# Patient Record
Sex: FEMALE | Race: WHITE
[De-identification: names, ages, dates, MRNs, and addresses within clinical notes are randomized per-mention and may not be internally consistent; named-entity substitution may affect disease eponyms.]

---

## 2017-09-20 ENCOUNTER — HOSPITAL ENCOUNTER (OUTPATIENT)
Dept: HOSPITAL 8 - STAR | Age: 57
Discharge: HOME | End: 2017-09-20
Attending: SURGERY
Payer: MEDICARE

## 2017-09-20 DIAGNOSIS — Z02.9: Primary | ICD-10-CM

## 2017-09-28 ENCOUNTER — HOSPITAL ENCOUNTER (OUTPATIENT)
Dept: HOSPITAL 8 - OUT | Age: 57
Discharge: HOME | End: 2017-09-28
Attending: SURGERY
Payer: MEDICARE

## 2017-09-28 VITALS — BODY MASS INDEX: 29.95 KG/M2 | WEIGHT: 209.22 LBS | HEIGHT: 70 IN

## 2017-09-28 VITALS — SYSTOLIC BLOOD PRESSURE: 128 MMHG | DIASTOLIC BLOOD PRESSURE: 82 MMHG

## 2017-09-28 DIAGNOSIS — I82.402: Primary | ICD-10-CM

## 2017-09-28 DIAGNOSIS — G89.29: ICD-10-CM

## 2017-09-28 DIAGNOSIS — E03.9: ICD-10-CM

## 2017-09-28 DIAGNOSIS — Z79.01: ICD-10-CM

## 2017-09-28 DIAGNOSIS — I10: ICD-10-CM

## 2017-09-28 DIAGNOSIS — E66.9: ICD-10-CM

## 2017-09-28 DIAGNOSIS — Z98.890: ICD-10-CM

## 2017-09-28 DIAGNOSIS — Z90.49: ICD-10-CM

## 2017-09-28 DIAGNOSIS — J44.9: ICD-10-CM

## 2017-09-28 DIAGNOSIS — F17.210: ICD-10-CM

## 2017-09-28 PROCEDURE — C1880 VENA CAVA FILTER: HCPCS

## 2017-09-28 PROCEDURE — C1769 GUIDE WIRE: HCPCS

## 2017-09-28 PROCEDURE — 37191 INS ENDOVAS VENA CAVA FILTR: CPT

## 2018-03-24 ENCOUNTER — HOSPITAL ENCOUNTER (INPATIENT)
Dept: HOSPITAL 8 - ED | Age: 58
LOS: 3 days | Discharge: HOME | DRG: 871 | End: 2018-03-27
Attending: HOSPITALIST | Admitting: HOSPITALIST
Payer: MEDICARE

## 2018-03-24 ENCOUNTER — HOSPITAL ENCOUNTER (EMERGENCY)
Dept: HOSPITAL 8 - ED | Age: 58
End: 2018-03-24
Payer: MEDICARE

## 2018-03-24 VITALS — HEIGHT: 70 IN | BODY MASS INDEX: 29.54 KG/M2 | WEIGHT: 206.35 LBS

## 2018-03-24 VITALS — SYSTOLIC BLOOD PRESSURE: 139 MMHG | DIASTOLIC BLOOD PRESSURE: 64 MMHG

## 2018-03-24 DIAGNOSIS — G89.29: ICD-10-CM

## 2018-03-24 DIAGNOSIS — F99: ICD-10-CM

## 2018-03-24 DIAGNOSIS — E55.9: ICD-10-CM

## 2018-03-24 DIAGNOSIS — Z98.1: ICD-10-CM
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
## 2018-03-24 DIAGNOSIS — T38.0X5A: ICD-10-CM

## 2018-03-24 DIAGNOSIS — M40.209: ICD-10-CM

## 2018-03-24 DIAGNOSIS — K14.6: ICD-10-CM

## 2018-03-24 DIAGNOSIS — K12.2: ICD-10-CM

## 2018-03-24 DIAGNOSIS — E78.5: ICD-10-CM

## 2018-03-24 DIAGNOSIS — B17.9: ICD-10-CM
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
## 2018-03-24 DIAGNOSIS — A41.9: Primary | ICD-10-CM

## 2018-03-24 DIAGNOSIS — J98.8: Primary | ICD-10-CM

## 2018-03-24 DIAGNOSIS — Z86.718: ICD-10-CM

## 2018-03-24 DIAGNOSIS — Z79.01: ICD-10-CM

## 2018-03-24 DIAGNOSIS — J96.01: ICD-10-CM

## 2018-03-24 DIAGNOSIS — D75.89: ICD-10-CM

## 2018-03-24 DIAGNOSIS — E03.9: ICD-10-CM

## 2018-03-24 DIAGNOSIS — Z53.21: ICD-10-CM

## 2018-03-24 DIAGNOSIS — Z91.040: ICD-10-CM

## 2018-03-24 DIAGNOSIS — I10: ICD-10-CM

## 2018-03-24 DIAGNOSIS — R13.10: ICD-10-CM
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
## 2018-03-24 DIAGNOSIS — S01.512A: ICD-10-CM
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
## 2018-03-24 DIAGNOSIS — F17.210: ICD-10-CM

## 2018-03-24 LAB
ALBUMIN SERPL-MCNC: 3.9 G/DL (ref 3.4–5)
ALP SERPL-CCNC: 77 U/L (ref 45–117)
ALT SERPL-CCNC: 23 U/L (ref 12–78)
ANION GAP SERPL CALC-SCNC: 6 MMOL/L (ref 5–15)
BASOPHILS # BLD AUTO: 0.03 X10^3/UL (ref 0–0.1)
BASOPHILS NFR BLD AUTO: 0 % (ref 0–1)
BILIRUB SERPL-MCNC: 0.8 MG/DL (ref 0.2–1)
CALCIUM SERPL-MCNC: 8.6 MG/DL (ref 8.5–10.1)
CHLORIDE SERPL-SCNC: 102 MMOL/L (ref 98–107)
CREAT SERPL-MCNC: 0.85 MG/DL (ref 0.55–1.02)
EOSINOPHIL # BLD AUTO: 0.01 X10^3/UL (ref 0–0.4)
EOSINOPHIL NFR BLD AUTO: 0 % (ref 1–7)
ERYTHROCYTE [DISTWIDTH] IN BLOOD BY AUTOMATED COUNT: 14.7 % (ref 9.6–15.2)
EST. AVERAGE GLUCOSE BLD GHB EST-MCNC: 100 MG/DL (ref 0–126)
HBA1C MFR BLD: 5.1 % (ref 4.2–6.3)
LYMPHOCYTES # BLD AUTO: 1.21 X10^3/UL (ref 1–3.4)
LYMPHOCYTES NFR BLD AUTO: 8 % (ref 22–44)
MCH RBC QN AUTO: 35.2 PG (ref 27–34.8)
MCHC RBC AUTO-ENTMCNC: 34.4 G/DL (ref 32.4–35.8)
MCV RBC AUTO: 102.5 FL (ref 80–100)
MD: (no result)
MONOCYTES # BLD AUTO: 0.94 X10^3/UL (ref 0.2–0.8)
MONOCYTES NFR BLD AUTO: 6 % (ref 2–9)
NEUTROPHILS # BLD AUTO: 13.63 X10^3/UL (ref 1.8–6.8)
NEUTROPHILS NFR BLD AUTO: 86 % (ref 42–75)
PLATELET # BLD AUTO: 240 X10^3/UL (ref 130–400)
PMV BLD AUTO: 7.1 FL (ref 7.4–10.4)
PROT SERPL-MCNC: 7.2 G/DL (ref 6.4–8.2)
RBC # BLD AUTO: 4.43 X10^6/UL (ref 3.82–5.3)
T4 FREE SERPL-MCNC: 1.11 NG/DL (ref 0.76–1.46)
TSH SERPL-ACNC: 7.66 MIU/L (ref 0.36–3.74)

## 2018-03-24 PROCEDURE — 84439 ASSAY OF FREE THYROXINE: CPT

## 2018-03-24 PROCEDURE — 87040 BLOOD CULTURE FOR BACTERIA: CPT

## 2018-03-24 PROCEDURE — 96367 TX/PROPH/DG ADDL SEQ IV INF: CPT

## 2018-03-24 PROCEDURE — 84443 ASSAY THYROID STIM HORMONE: CPT

## 2018-03-24 PROCEDURE — 99291 CRITICAL CARE FIRST HOUR: CPT

## 2018-03-24 PROCEDURE — 82746 ASSAY OF FOLIC ACID SERUM: CPT

## 2018-03-24 PROCEDURE — 87205 SMEAR GRAM STAIN: CPT

## 2018-03-24 PROCEDURE — 87324 CLOSTRIDIUM AG IA: CPT

## 2018-03-24 PROCEDURE — 85025 COMPLETE CBC W/AUTO DIFF WBC: CPT

## 2018-03-24 PROCEDURE — 82607 VITAMIN B-12: CPT

## 2018-03-24 PROCEDURE — 87081 CULTURE SCREEN ONLY: CPT

## 2018-03-24 PROCEDURE — 81001 URINALYSIS AUTO W/SCOPE: CPT

## 2018-03-24 PROCEDURE — 83735 ASSAY OF MAGNESIUM: CPT

## 2018-03-24 PROCEDURE — 36415 COLL VENOUS BLD VENIPUNCTURE: CPT

## 2018-03-24 PROCEDURE — 71045 X-RAY EXAM CHEST 1 VIEW: CPT

## 2018-03-24 PROCEDURE — 83605 ASSAY OF LACTIC ACID: CPT

## 2018-03-24 PROCEDURE — 80053 COMPREHEN METABOLIC PANEL: CPT

## 2018-03-24 PROCEDURE — 96376 TX/PRO/DX INJ SAME DRUG ADON: CPT

## 2018-03-24 PROCEDURE — 83036 HEMOGLOBIN GLYCOSYLATED A1C: CPT

## 2018-03-24 PROCEDURE — 82306 VITAMIN D 25 HYDROXY: CPT

## 2018-03-24 PROCEDURE — 87070 CULTURE OTHR SPECIMN AEROBIC: CPT

## 2018-03-24 PROCEDURE — 96365 THER/PROPH/DIAG IV INF INIT: CPT

## 2018-03-24 PROCEDURE — 80061 LIPID PANEL: CPT

## 2018-03-24 PROCEDURE — 93005 ELECTROCARDIOGRAM TRACING: CPT

## 2018-03-24 PROCEDURE — 84145 PROCALCITONIN (PCT): CPT

## 2018-03-24 PROCEDURE — 70491 CT SOFT TISSUE NECK W/DYE: CPT

## 2018-03-24 PROCEDURE — 0W933ZZ DRAINAGE OF ORAL CAVITY AND THROAT, PERCUTANEOUS APPROACH: ICD-10-PCS | Performed by: OTOLARYNGOLOGY

## 2018-03-24 PROCEDURE — 96375 TX/PRO/DX INJ NEW DRUG ADDON: CPT

## 2018-03-24 RX ADMIN — MORPHINE SULFATE PRN MG: 4 INJECTION INTRAVENOUS at 21:48

## 2018-03-24 RX ADMIN — DEXAMETHASONE SODIUM PHOSPHATE SCH MG: 4 INJECTION, SOLUTION INTRA-ARTICULAR; INTRALESIONAL; INTRAMUSCULAR; INTRAVENOUS; SOFT TISSUE at 22:54

## 2018-03-24 RX ADMIN — MORPHINE SULFATE PRN MG: 4 INJECTION INTRAVENOUS at 20:24

## 2018-03-24 RX ADMIN — NICOTINE SCH PATCH: 7 PATCH, EXTENDED RELEASE TRANSDERMAL at 22:54

## 2018-03-24 RX ADMIN — DEXTROSE, SODIUM CHLORIDE, AND POTASSIUM CHLORIDE SCH MLS/HR: 5; .9; .15 INJECTION INTRAVENOUS at 22:44

## 2018-03-24 RX ADMIN — MORPHINE SULFATE PRN MG: 10 INJECTION INTRAVENOUS at 22:54

## 2018-03-25 VITALS — SYSTOLIC BLOOD PRESSURE: 113 MMHG | DIASTOLIC BLOOD PRESSURE: 61 MMHG

## 2018-03-25 LAB
ALBUMIN SERPL-MCNC: 3.5 G/DL (ref 3.4–5)
ALP SERPL-CCNC: 224 U/L (ref 45–117)
ALT SERPL-CCNC: 160 U/L (ref 12–78)
ANION GAP SERPL CALC-SCNC: 7 MMOL/L (ref 5–15)
BASOPHILS # BLD AUTO: 0.02 X10^3/UL (ref 0–0.1)
BASOPHILS NFR BLD AUTO: 0 % (ref 0–1)
BILIRUB SERPL-MCNC: 1.2 MG/DL (ref 0.2–1)
CALCIUM SERPL-MCNC: 8.4 MG/DL (ref 8.5–10.1)
CHLORIDE SERPL-SCNC: 107 MMOL/L (ref 98–107)
CHOL/HDL RATIO: 1.6
CREAT SERPL-MCNC: 0.86 MG/DL (ref 0.55–1.02)
CULTURE INDICATED?: NO
EOSINOPHIL # BLD AUTO: 0.01 X10^3/UL (ref 0–0.4)
EOSINOPHIL NFR BLD AUTO: 0 % (ref 1–7)
ERYTHROCYTE [DISTWIDTH] IN BLOOD BY AUTOMATED COUNT: 15 % (ref 9.6–15.2)
HDL CHOL %: 62 % (ref 28–40)
HDL CHOLESTEROL (DIRECT): 109 MG/DL (ref 40–60)
LDL CHOLESTEROL,CALCULATED: 56 MG/DL (ref 54–169)
LDLC/HDLC SERPL: 0.5 {RATIO} (ref 0.5–3)
LYMPHOCYTES # BLD AUTO: 0.5 X10^3/UL (ref 1–3.4)
LYMPHOCYTES NFR BLD AUTO: 3 % (ref 22–44)
MCH RBC QN AUTO: 36 PG (ref 27–34.8)
MCHC RBC AUTO-ENTMCNC: 34.3 G/DL (ref 32.4–35.8)
MCV RBC AUTO: 105 FL (ref 80–100)
MD: NO
MICROSCOPIC: (no result)
MONOCYTES # BLD AUTO: 0.51 X10^3/UL (ref 0.2–0.8)
MONOCYTES NFR BLD AUTO: 3 % (ref 2–9)
NEUTROPHILS # BLD AUTO: 16.69 X10^3/UL (ref 1.8–6.8)
NEUTROPHILS NFR BLD AUTO: 94 % (ref 42–75)
PLATELET # BLD AUTO: 239 X10^3/UL (ref 130–400)
PMV BLD AUTO: 7.3 FL (ref 7.4–10.4)
PROT SERPL-MCNC: 7.2 G/DL (ref 6.4–8.2)
RBC # BLD AUTO: 4.28 X10^6/UL (ref 3.82–5.3)
TRIGL SERPL-MCNC: 61 MG/DL (ref 50–200)
VLDLC SERPL CALC-MCNC: 12 MG/DL (ref 0–25)

## 2018-03-25 RX ADMIN — KETOROLAC TROMETHAMINE PRN MG: 30 INJECTION, SOLUTION INTRAMUSCULAR at 17:19

## 2018-03-25 RX ADMIN — DEXAMETHASONE SODIUM PHOSPHATE SCH MG: 4 INJECTION, SOLUTION INTRA-ARTICULAR; INTRALESIONAL; INTRAMUSCULAR; INTRAVENOUS; SOFT TISSUE at 22:18

## 2018-03-25 RX ADMIN — DEXTROSE SCH MLS/HR: 50 INJECTION, SOLUTION INTRAVENOUS at 13:34

## 2018-03-25 RX ADMIN — DEXTROSE, SODIUM CHLORIDE, AND POTASSIUM CHLORIDE SCH MLS/HR: 5; .9; .15 INJECTION INTRAVENOUS at 10:12

## 2018-03-25 RX ADMIN — DEXTROSE SCH MLS/HR: 50 INJECTION, SOLUTION INTRAVENOUS at 07:49

## 2018-03-25 RX ADMIN — DEXTROSE SCH MLS/HR: 50 INJECTION, SOLUTION INTRAVENOUS at 19:31

## 2018-03-25 RX ADMIN — MORPHINE SULFATE PRN MG: 10 INJECTION INTRAVENOUS at 07:50

## 2018-03-25 RX ADMIN — DEXAMETHASONE SODIUM PHOSPHATE SCH MG: 4 INJECTION, SOLUTION INTRA-ARTICULAR; INTRALESIONAL; INTRAMUSCULAR; INTRAVENOUS; SOFT TISSUE at 10:13

## 2018-03-25 RX ADMIN — KETOROLAC TROMETHAMINE PRN MG: 30 INJECTION, SOLUTION INTRAMUSCULAR at 10:52

## 2018-03-25 RX ADMIN — DEXAMETHASONE SODIUM PHOSPHATE SCH MG: 4 INJECTION, SOLUTION INTRA-ARTICULAR; INTRALESIONAL; INTRAMUSCULAR; INTRAVENOUS; SOFT TISSUE at 17:18

## 2018-03-25 RX ADMIN — NICOTINE SCH PATCH: 7 PATCH, EXTENDED RELEASE TRANSDERMAL at 22:18

## 2018-03-25 RX ADMIN — Medication SCH MCG: at 07:51

## 2018-03-25 RX ADMIN — OXYCODONE HYDROCHLORIDE PRN MG: 5 TABLET ORAL at 00:59

## 2018-03-25 RX ADMIN — LEVOTHYROXINE SODIUM ANHYDROUS SCH MCG: 100 INJECTION, POWDER, LYOPHILIZED, FOR SOLUTION INTRAVENOUS at 07:51

## 2018-03-25 RX ADMIN — DEXTROSE SCH MLS/HR: 50 INJECTION, SOLUTION INTRAVENOUS at 01:00

## 2018-03-25 RX ADMIN — MORPHINE SULFATE PRN MG: 10 INJECTION INTRAVENOUS at 04:45

## 2018-03-25 RX ADMIN — MORPHINE SULFATE PRN MG: 10 INJECTION INTRAVENOUS at 01:53

## 2018-03-25 RX ADMIN — VANCOMYCIN HYDROCHLORIDE SCH MLS/HR: 1 INJECTION, POWDER, LYOPHILIZED, FOR SOLUTION INTRAVENOUS at 14:46

## 2018-03-25 RX ADMIN — DEXAMETHASONE SODIUM PHOSPHATE SCH MG: 4 INJECTION, SOLUTION INTRA-ARTICULAR; INTRALESIONAL; INTRAMUSCULAR; INTRAVENOUS; SOFT TISSUE at 04:12

## 2018-03-26 VITALS — DIASTOLIC BLOOD PRESSURE: 78 MMHG | SYSTOLIC BLOOD PRESSURE: 120 MMHG

## 2018-03-26 VITALS — SYSTOLIC BLOOD PRESSURE: 142 MMHG | DIASTOLIC BLOOD PRESSURE: 87 MMHG

## 2018-03-26 VITALS — SYSTOLIC BLOOD PRESSURE: 131 MMHG | DIASTOLIC BLOOD PRESSURE: 76 MMHG

## 2018-03-26 LAB
ALBUMIN SERPL-MCNC: 3.1 G/DL (ref 3.4–5)
ALP SERPL-CCNC: 130 U/L (ref 45–117)
ALT SERPL-CCNC: 96 U/L (ref 12–78)
ANION GAP SERPL CALC-SCNC: 5 MMOL/L (ref 5–15)
BASOPHILS # BLD AUTO: 0.03 X10^3/UL (ref 0–0.1)
BASOPHILS NFR BLD AUTO: 0 % (ref 0–1)
BILIRUB SERPL-MCNC: 0.5 MG/DL (ref 0.2–1)
CALCIUM SERPL-MCNC: 8.4 MG/DL (ref 8.5–10.1)
CHLORIDE SERPL-SCNC: 109 MMOL/L (ref 98–107)
CREAT SERPL-MCNC: 0.85 MG/DL (ref 0.55–1.02)
EOSINOPHIL # BLD AUTO: 0 X10^3/UL (ref 0–0.4)
EOSINOPHIL NFR BLD AUTO: 0 % (ref 1–7)
ERYTHROCYTE [DISTWIDTH] IN BLOOD BY AUTOMATED COUNT: 14.9 % (ref 9.6–15.2)
LYMPHOCYTES # BLD AUTO: 0.81 X10^3/UL (ref 1–3.4)
LYMPHOCYTES NFR BLD AUTO: 5 % (ref 22–44)
MCH RBC QN AUTO: 35.5 PG (ref 27–34.8)
MCHC RBC AUTO-ENTMCNC: 33.3 G/DL (ref 32.4–35.8)
MCV RBC AUTO: 106.4 FL (ref 80–100)
MD: NO
MONOCYTES # BLD AUTO: 0.66 X10^3/UL (ref 0.2–0.8)
MONOCYTES NFR BLD AUTO: 4 % (ref 2–9)
NEUTROPHILS # BLD AUTO: 14.72 X10^3/UL (ref 1.8–6.8)
NEUTROPHILS NFR BLD AUTO: 91 % (ref 42–75)
PLATELET # BLD AUTO: 231 X10^3/UL (ref 130–400)
PMV BLD AUTO: 7.3 FL (ref 7.4–10.4)
PROT SERPL-MCNC: 6.6 G/DL (ref 6.4–8.2)
RBC # BLD AUTO: 4.08 X10^6/UL (ref 3.82–5.3)

## 2018-03-26 RX ADMIN — Medication SCH MCG: at 09:11

## 2018-03-26 RX ADMIN — DEXAMETHASONE SODIUM PHOSPHATE SCH MG: 4 INJECTION, SOLUTION INTRA-ARTICULAR; INTRALESIONAL; INTRAMUSCULAR; INTRAVENOUS; SOFT TISSUE at 16:42

## 2018-03-26 RX ADMIN — OXYCODONE HYDROCHLORIDE PRN MG: 5 TABLET ORAL at 20:30

## 2018-03-26 RX ADMIN — DEXAMETHASONE SODIUM PHOSPHATE SCH MG: 4 INJECTION, SOLUTION INTRA-ARTICULAR; INTRALESIONAL; INTRAMUSCULAR; INTRAVENOUS; SOFT TISSUE at 21:37

## 2018-03-26 RX ADMIN — DEXAMETHASONE SODIUM PHOSPHATE SCH MG: 4 INJECTION, SOLUTION INTRA-ARTICULAR; INTRALESIONAL; INTRAMUSCULAR; INTRAVENOUS; SOFT TISSUE at 10:48

## 2018-03-26 RX ADMIN — KETOROLAC TROMETHAMINE PRN MG: 30 INJECTION, SOLUTION INTRAMUSCULAR at 15:38

## 2018-03-26 RX ADMIN — VANCOMYCIN HYDROCHLORIDE SCH MLS/HR: 1 INJECTION, POWDER, LYOPHILIZED, FOR SOLUTION INTRAVENOUS at 09:11

## 2018-03-26 RX ADMIN — KETOROLAC TROMETHAMINE PRN MG: 30 INJECTION, SOLUTION INTRAMUSCULAR at 01:02

## 2018-03-26 RX ADMIN — KETOROLAC TROMETHAMINE PRN MG: 30 INJECTION, SOLUTION INTRAMUSCULAR at 09:08

## 2018-03-26 RX ADMIN — NICOTINE SCH PATCH: 7 PATCH, EXTENDED RELEASE TRANSDERMAL at 21:37

## 2018-03-26 RX ADMIN — DEXAMETHASONE SODIUM PHOSPHATE SCH MG: 4 INJECTION, SOLUTION INTRA-ARTICULAR; INTRALESIONAL; INTRAMUSCULAR; INTRAVENOUS; SOFT TISSUE at 04:46

## 2018-03-26 RX ADMIN — DEXTROSE SCH MLS/HR: 50 INJECTION, SOLUTION INTRAVENOUS at 02:07

## 2018-03-26 RX ADMIN — LEVOTHYROXINE SODIUM ANHYDROUS SCH MCG: 100 INJECTION, POWDER, LYOPHILIZED, FOR SOLUTION INTRAVENOUS at 09:06

## 2018-03-26 RX ADMIN — AMPICILLIN SODIUM AND SULBACTAM SODIUM SCH MLS/HR: 2; 1 INJECTION, POWDER, FOR SOLUTION INTRAMUSCULAR; INTRAVENOUS at 14:30

## 2018-03-26 RX ADMIN — LEVOTHYROXINE SODIUM SCH MCG: 175 TABLET ORAL at 09:11

## 2018-03-26 RX ADMIN — DEXTROSE SCH MLS/HR: 50 INJECTION, SOLUTION INTRAVENOUS at 13:59

## 2018-03-26 RX ADMIN — AMPICILLIN SODIUM AND SULBACTAM SODIUM SCH MLS/HR: 2; 1 INJECTION, POWDER, FOR SOLUTION INTRAMUSCULAR; INTRAVENOUS at 21:34

## 2018-03-26 RX ADMIN — DEXTROSE SCH MLS/HR: 50 INJECTION, SOLUTION INTRAVENOUS at 08:15

## 2018-03-26 RX ADMIN — FOLIC ACID SCH MG: 1 TABLET ORAL at 09:10

## 2018-03-27 VITALS — DIASTOLIC BLOOD PRESSURE: 78 MMHG | SYSTOLIC BLOOD PRESSURE: 146 MMHG

## 2018-03-27 VITALS — DIASTOLIC BLOOD PRESSURE: 76 MMHG | SYSTOLIC BLOOD PRESSURE: 152 MMHG

## 2018-03-27 VITALS — SYSTOLIC BLOOD PRESSURE: 148 MMHG | DIASTOLIC BLOOD PRESSURE: 78 MMHG

## 2018-03-27 VITALS — DIASTOLIC BLOOD PRESSURE: 78 MMHG | SYSTOLIC BLOOD PRESSURE: 148 MMHG

## 2018-03-27 LAB
CLOSTRIDIUM DIFFICILE ANTIGEN: NEGATIVE
CLOSTRIDIUM DIFFICILE TOXIN: NEGATIVE

## 2018-03-27 RX ADMIN — AMPICILLIN SODIUM AND SULBACTAM SODIUM SCH MLS/HR: 2; 1 INJECTION, POWDER, FOR SOLUTION INTRAMUSCULAR; INTRAVENOUS at 03:32

## 2018-03-27 RX ADMIN — LEVOTHYROXINE SODIUM SCH MCG: 175 TABLET ORAL at 06:16

## 2018-03-27 RX ADMIN — OXYCODONE HYDROCHLORIDE PRN MG: 5 TABLET ORAL at 10:37

## 2018-03-27 RX ADMIN — OXYCODONE HYDROCHLORIDE PRN MG: 5 TABLET ORAL at 14:40

## 2018-03-27 RX ADMIN — OXYCODONE HYDROCHLORIDE PRN MG: 5 TABLET ORAL at 06:18

## 2018-03-27 RX ADMIN — FOLIC ACID SCH MG: 1 TABLET ORAL at 07:57

## 2018-03-27 RX ADMIN — OXYCODONE HYDROCHLORIDE PRN MG: 5 TABLET ORAL at 01:29

## 2018-03-27 RX ADMIN — DEXAMETHASONE SODIUM PHOSPHATE SCH MG: 4 INJECTION, SOLUTION INTRA-ARTICULAR; INTRALESIONAL; INTRAMUSCULAR; INTRAVENOUS; SOFT TISSUE at 03:33

## 2018-03-27 RX ADMIN — DEXAMETHASONE SODIUM PHOSPHATE SCH MG: 4 INJECTION, SOLUTION INTRA-ARTICULAR; INTRALESIONAL; INTRAMUSCULAR; INTRAVENOUS; SOFT TISSUE at 10:39

## 2018-03-27 RX ADMIN — Medication SCH MCG: at 07:57

## 2018-03-27 RX ADMIN — AMPICILLIN SODIUM AND SULBACTAM SODIUM SCH MLS/HR: 2; 1 INJECTION, POWDER, FOR SOLUTION INTRAMUSCULAR; INTRAVENOUS at 10:38

## 2018-04-11 ENCOUNTER — HOSPITAL ENCOUNTER (OUTPATIENT)
Dept: HOSPITAL 8 - CFH | Age: 58
End: 2018-04-11
Attending: PHYSICIAN ASSISTANT
Payer: MEDICARE

## 2018-04-11 DIAGNOSIS — Z02.9: Primary | ICD-10-CM

## 2018-04-19 ENCOUNTER — HOSPITAL ENCOUNTER (OUTPATIENT)
Dept: HOSPITAL 8 - RAD | Age: 58
End: 2018-04-19
Attending: PHYSICIAN ASSISTANT
Payer: MEDICARE

## 2018-04-19 DIAGNOSIS — D25.9: Primary | ICD-10-CM

## 2018-04-19 DIAGNOSIS — M53.3: ICD-10-CM

## 2018-04-19 PROCEDURE — 99157 MOD SED OTHER PHYS/QHP EA: CPT

## 2018-04-19 PROCEDURE — 99156 MOD SED OTH PHYS/QHP 5/>YRS: CPT

## 2018-04-19 PROCEDURE — 72195 MRI PELVIS W/O DYE: CPT

## 2018-06-06 ENCOUNTER — HOSPITAL ENCOUNTER (OUTPATIENT)
Dept: HOSPITAL 8 - RAD | Age: 58
Discharge: HOME | End: 2018-06-06
Attending: NURSE PRACTITIONER
Payer: MEDICARE

## 2018-06-06 DIAGNOSIS — G62.9: ICD-10-CM

## 2018-06-06 DIAGNOSIS — G31.9: Primary | ICD-10-CM

## 2018-06-06 DIAGNOSIS — R90.82: ICD-10-CM

## 2018-06-06 PROCEDURE — 70551 MRI BRAIN STEM W/O DYE: CPT

## 2018-06-13 ENCOUNTER — HOSPITAL ENCOUNTER (OUTPATIENT)
Dept: HOSPITAL 8 - CARD | Age: 58
Discharge: HOME | End: 2018-06-13
Attending: NURSE PRACTITIONER
Payer: MEDICARE

## 2018-06-13 DIAGNOSIS — G62.9: Primary | ICD-10-CM

## 2018-06-13 PROCEDURE — 95819 EEG AWAKE AND ASLEEP: CPT

## 2018-10-05 ENCOUNTER — HOSPITAL ENCOUNTER (OUTPATIENT)
Dept: HOSPITAL 8 - RAD | Age: 58
Discharge: HOME | End: 2018-10-05
Attending: PHYSICIAN ASSISTANT
Payer: MEDICARE

## 2018-10-05 DIAGNOSIS — M43.16: ICD-10-CM

## 2018-10-05 DIAGNOSIS — M43.27: Primary | ICD-10-CM

## 2018-10-05 PROCEDURE — 72131 CT LUMBAR SPINE W/O DYE: CPT

## 2018-11-29 ENCOUNTER — HOSPITAL ENCOUNTER (OUTPATIENT)
Dept: HOSPITAL 8 - STAR | Age: 58
Discharge: HOME | End: 2018-11-29
Attending: NEUROLOGICAL SURGERY
Payer: MEDICARE

## 2018-11-29 DIAGNOSIS — Z01.818: Primary | ICD-10-CM

## 2018-11-29 DIAGNOSIS — Z90.49: ICD-10-CM

## 2018-11-29 DIAGNOSIS — T84.84XD: ICD-10-CM

## 2018-11-29 DIAGNOSIS — M54.5: ICD-10-CM

## 2018-11-29 DIAGNOSIS — J43.9: ICD-10-CM

## 2018-11-29 DIAGNOSIS — M53.3: ICD-10-CM

## 2018-11-29 LAB
ANION GAP SERPL CALC-SCNC: 9 MMOL/L (ref 5–15)
APTT BLD: 29 SECONDS (ref 25–31)
BASOPHILS # BLD AUTO: 0.04 X10^3/UL (ref 0–0.1)
BASOPHILS NFR BLD AUTO: 1 % (ref 0–1)
CALCIUM SERPL-MCNC: 9.1 MG/DL (ref 8.5–10.1)
CHLORIDE SERPL-SCNC: 103 MMOL/L (ref 98–107)
CREAT SERPL-MCNC: 0.92 MG/DL (ref 0.55–1.02)
CULTURE INDICATED?: YES
EOSINOPHIL # BLD AUTO: 0.08 X10^3/UL (ref 0–0.4)
EOSINOPHIL NFR BLD AUTO: 1 % (ref 1–7)
ERYTHROCYTE [DISTWIDTH] IN BLOOD BY AUTOMATED COUNT: 12.6 % (ref 9.6–15.2)
INR PPP: 1.04 (ref 0.93–1.1)
LYMPHOCYTES # BLD AUTO: 2.49 X10^3/UL (ref 1–3.4)
LYMPHOCYTES NFR BLD AUTO: 31 % (ref 22–44)
MCH RBC QN AUTO: 34.1 PG (ref 27–34.8)
MCHC RBC AUTO-ENTMCNC: 34.1 G/DL (ref 32.4–35.8)
MCV RBC AUTO: 100 FL (ref 80–100)
MD: NO
MICROSCOPIC: (no result)
MONOCYTES # BLD AUTO: 0.48 X10^3/UL (ref 0.2–0.8)
MONOCYTES NFR BLD AUTO: 6 % (ref 2–9)
NEUTROPHILS # BLD AUTO: 5 X10^3/UL (ref 1.8–6.8)
NEUTROPHILS NFR BLD AUTO: 62 % (ref 42–75)
PLATELET # BLD AUTO: 246 X10^3/UL (ref 130–400)
PMV BLD AUTO: 7.4 FL (ref 7.4–10.4)
PROTHROMBIN TIME: 11 SECONDS (ref 9.6–11.5)
RBC # BLD AUTO: 4.37 X10^6/UL (ref 3.82–5.3)

## 2018-11-29 PROCEDURE — 85025 COMPLETE CBC W/AUTO DIFF WBC: CPT

## 2018-11-29 PROCEDURE — 80048 BASIC METABOLIC PNL TOTAL CA: CPT

## 2018-11-29 PROCEDURE — 81001 URINALYSIS AUTO W/SCOPE: CPT

## 2018-11-29 PROCEDURE — 36415 COLL VENOUS BLD VENIPUNCTURE: CPT

## 2018-11-29 PROCEDURE — 85610 PROTHROMBIN TIME: CPT

## 2018-11-29 PROCEDURE — 85730 THROMBOPLASTIN TIME PARTIAL: CPT

## 2018-11-29 PROCEDURE — 72114 X-RAY EXAM L-S SPINE BENDING: CPT

## 2018-11-29 PROCEDURE — 71046 X-RAY EXAM CHEST 2 VIEWS: CPT

## 2018-11-29 PROCEDURE — 87086 URINE CULTURE/COLONY COUNT: CPT

## 2018-11-29 PROCEDURE — 93005 ELECTROCARDIOGRAM TRACING: CPT

## 2018-12-07 ENCOUNTER — HOSPITAL ENCOUNTER (INPATIENT)
Dept: HOSPITAL 8 - OUT | Age: 58
LOS: 1 days | Discharge: HOME | DRG: 515 | End: 2018-12-08
Attending: NEUROLOGICAL SURGERY | Admitting: NEUROLOGICAL SURGERY
Payer: MEDICARE

## 2018-12-07 VITALS — DIASTOLIC BLOOD PRESSURE: 59 MMHG | SYSTOLIC BLOOD PRESSURE: 94 MMHG

## 2018-12-07 VITALS — BODY MASS INDEX: 23.67 KG/M2 | WEIGHT: 165.35 LBS | HEIGHT: 70 IN

## 2018-12-07 VITALS — SYSTOLIC BLOOD PRESSURE: 102 MMHG | DIASTOLIC BLOOD PRESSURE: 71 MMHG

## 2018-12-07 VITALS — DIASTOLIC BLOOD PRESSURE: 56 MMHG | SYSTOLIC BLOOD PRESSURE: 92 MMHG

## 2018-12-07 VITALS — SYSTOLIC BLOOD PRESSURE: 91 MMHG | DIASTOLIC BLOOD PRESSURE: 58 MMHG

## 2018-12-07 DIAGNOSIS — Z79.01: ICD-10-CM

## 2018-12-07 DIAGNOSIS — G89.29: ICD-10-CM

## 2018-12-07 DIAGNOSIS — Z98.1: ICD-10-CM

## 2018-12-07 DIAGNOSIS — R53.2: ICD-10-CM

## 2018-12-07 DIAGNOSIS — Z91.040: ICD-10-CM
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
## 2018-12-07 DIAGNOSIS — G25.9: ICD-10-CM

## 2018-12-07 DIAGNOSIS — Z86.718: ICD-10-CM

## 2018-12-07 DIAGNOSIS — T84.84XA: Primary | ICD-10-CM

## 2018-12-07 PROCEDURE — 72100 X-RAY EXAM L-S SPINE 2/3 VWS: CPT

## 2018-12-07 PROCEDURE — 01NB0ZZ RELEASE LUMBAR NERVE, OPEN APPROACH: ICD-10-PCS | Performed by: NEUROLOGICAL SURGERY

## 2018-12-07 PROCEDURE — 0SP304Z REMOVAL OF INTERNAL FIXATION DEVICE FROM LUMBOSACRAL JOINT, OPEN APPROACH: ICD-10-PCS | Performed by: NEUROLOGICAL SURGERY

## 2018-12-07 RX ADMIN — SODIUM CHLORIDE SCH MLS/HR: 9 INJECTION, SOLUTION INTRAVENOUS at 23:36

## 2018-12-07 RX ADMIN — DIAZEPAM PRN MG: 5 INJECTION, SOLUTION INTRAMUSCULAR; INTRAVENOUS at 15:20

## 2018-12-07 RX ADMIN — DIAZEPAM PRN MG: 5 INJECTION, SOLUTION INTRAMUSCULAR; INTRAVENOUS at 15:40

## 2018-12-07 RX ADMIN — METHOCARBAMOL SCH MG: 750 TABLET ORAL at 18:15

## 2018-12-07 RX ADMIN — METHOCARBAMOL SCH MG: 750 TABLET ORAL at 23:00

## 2018-12-07 RX ADMIN — FAMOTIDINE SCH MG: 20 TABLET, FILM COATED ORAL at 20:06

## 2018-12-07 RX ADMIN — FENTANYL CITRATE PRN MCG: 50 INJECTION INTRAMUSCULAR; INTRAVENOUS at 15:04

## 2018-12-07 RX ADMIN — HYDROCODONE BITARTRATE AND ACETAMINOPHEN PRN TAB: 10; 325 TABLET ORAL at 18:43

## 2018-12-07 RX ADMIN — FENTANYL CITRATE PRN MCG: 50 INJECTION INTRAMUSCULAR; INTRAVENOUS at 15:15

## 2018-12-07 RX ADMIN — DEXTROSE, SODIUM CHLORIDE, AND POTASSIUM CHLORIDE SCH MLS/HR: 5; .9; .15 INJECTION INTRAVENOUS at 20:06

## 2018-12-08 VITALS — SYSTOLIC BLOOD PRESSURE: 88 MMHG | DIASTOLIC BLOOD PRESSURE: 52 MMHG

## 2018-12-08 VITALS — DIASTOLIC BLOOD PRESSURE: 49 MMHG | SYSTOLIC BLOOD PRESSURE: 88 MMHG

## 2018-12-08 VITALS — DIASTOLIC BLOOD PRESSURE: 54 MMHG | SYSTOLIC BLOOD PRESSURE: 93 MMHG

## 2018-12-08 VITALS — DIASTOLIC BLOOD PRESSURE: 45 MMHG | SYSTOLIC BLOOD PRESSURE: 94 MMHG

## 2018-12-08 VITALS — DIASTOLIC BLOOD PRESSURE: 47 MMHG | SYSTOLIC BLOOD PRESSURE: 84 MMHG

## 2018-12-08 RX ADMIN — METHOCARBAMOL SCH MG: 750 TABLET ORAL at 01:20

## 2018-12-08 RX ADMIN — DEXTROSE, SODIUM CHLORIDE, AND POTASSIUM CHLORIDE SCH MLS/HR: 5; .9; .15 INJECTION INTRAVENOUS at 05:13

## 2018-12-08 RX ADMIN — METHOCARBAMOL SCH MG: 750 TABLET ORAL at 08:24

## 2018-12-08 RX ADMIN — SODIUM CHLORIDE SCH MLS/HR: 9 INJECTION, SOLUTION INTRAVENOUS at 07:20

## 2018-12-08 RX ADMIN — FAMOTIDINE SCH MG: 20 TABLET, FILM COATED ORAL at 08:24

## 2018-12-08 RX ADMIN — HYDROCODONE BITARTRATE AND ACETAMINOPHEN PRN TAB: 10; 325 TABLET ORAL at 10:53

## 2019-01-09 ENCOUNTER — HOSPITAL ENCOUNTER (EMERGENCY)
Dept: HOSPITAL 8 - ED | Age: 59
Discharge: HOME | End: 2019-01-09
Payer: MEDICARE

## 2019-01-09 VITALS — SYSTOLIC BLOOD PRESSURE: 113 MMHG | DIASTOLIC BLOOD PRESSURE: 71 MMHG

## 2019-01-09 VITALS — WEIGHT: 161.16 LBS | BODY MASS INDEX: 23.07 KG/M2 | HEIGHT: 70 IN

## 2019-01-09 DIAGNOSIS — M54.6: ICD-10-CM

## 2019-01-09 DIAGNOSIS — I10: ICD-10-CM

## 2019-01-09 DIAGNOSIS — J44.9: ICD-10-CM

## 2019-01-09 DIAGNOSIS — R06.00: ICD-10-CM

## 2019-01-09 DIAGNOSIS — Z90.49: ICD-10-CM

## 2019-01-09 DIAGNOSIS — R07.89: Primary | ICD-10-CM

## 2019-01-09 DIAGNOSIS — Z91.040: ICD-10-CM

## 2019-01-09 DIAGNOSIS — F11.23: ICD-10-CM

## 2019-01-09 DIAGNOSIS — Z86.718: ICD-10-CM

## 2019-01-09 LAB
ALBUMIN SERPL-MCNC: 4.4 G/DL (ref 3.4–5)
ALP SERPL-CCNC: 98 U/L (ref 45–117)
ALT SERPL-CCNC: 17 U/L (ref 12–78)
ANION GAP SERPL CALC-SCNC: 9 MMOL/L (ref 5–15)
BASOPHILS # BLD AUTO: 0.07 X10^3/UL (ref 0–0.1)
BASOPHILS NFR BLD AUTO: 1 % (ref 0–1)
BILIRUB SERPL-MCNC: 0.9 MG/DL (ref 0.2–1)
CALCIUM SERPL-MCNC: 9.1 MG/DL (ref 8.5–10.1)
CHLORIDE SERPL-SCNC: 96 MMOL/L (ref 98–107)
CREAT SERPL-MCNC: 0.85 MG/DL (ref 0.55–1.02)
EOSINOPHIL # BLD AUTO: 0.08 X10^3/UL (ref 0–0.4)
EOSINOPHIL NFR BLD AUTO: 1 % (ref 1–7)
ERYTHROCYTE [DISTWIDTH] IN BLOOD BY AUTOMATED COUNT: 12.8 % (ref 9.6–15.2)
LYMPHOCYTES # BLD AUTO: 2.12 X10^3/UL (ref 1–3.4)
LYMPHOCYTES NFR BLD AUTO: 25 % (ref 22–44)
MCH RBC QN AUTO: 33 PG (ref 27–34.8)
MCHC RBC AUTO-ENTMCNC: 33.7 G/DL (ref 32.4–35.8)
MCV RBC AUTO: 97.8 FL (ref 80–100)
MD: NO
MONOCYTES # BLD AUTO: 0.52 X10^3/UL (ref 0.2–0.8)
MONOCYTES NFR BLD AUTO: 6 % (ref 2–9)
NEUTROPHILS # BLD AUTO: 5.81 X10^3/UL (ref 1.8–6.8)
NEUTROPHILS NFR BLD AUTO: 68 % (ref 42–75)
PLATELET # BLD AUTO: 243 X10^3/UL (ref 130–400)
PMV BLD AUTO: 7.3 FL (ref 7.4–10.4)
PROT SERPL-MCNC: 6.9 G/DL (ref 6.4–8.2)
RBC # BLD AUTO: 4.47 X10^6/UL (ref 3.82–5.3)
TROPONIN I SERPL-MCNC: < 0.015 NG/ML (ref 0–0.04)

## 2019-01-09 PROCEDURE — 71275 CT ANGIOGRAPHY CHEST: CPT

## 2019-01-09 PROCEDURE — 36415 COLL VENOUS BLD VENIPUNCTURE: CPT

## 2019-01-09 PROCEDURE — 85025 COMPLETE CBC W/AUTO DIFF WBC: CPT

## 2019-01-09 PROCEDURE — 80053 COMPREHEN METABOLIC PANEL: CPT

## 2019-01-09 PROCEDURE — 99284 EMERGENCY DEPT VISIT MOD MDM: CPT

## 2019-01-09 PROCEDURE — 96374 THER/PROPH/DIAG INJ IV PUSH: CPT

## 2019-01-09 PROCEDURE — 93005 ELECTROCARDIOGRAM TRACING: CPT

## 2019-01-09 PROCEDURE — 84484 ASSAY OF TROPONIN QUANT: CPT

## 2019-01-09 NOTE — NUR
PT LYING QUIETLY ON GURNEY.  VOIDED URINE IN COLLECTION HAT.  SPECIMEN WILL BE 
SENT TO LAB.  CT TECH BS.  PT STATES "I'M NOT SURE I CAN LIE STILL".  ADVISED 
GOING TO CT AND GIVING IT A TRY.

## 2019-01-09 NOTE — NUR
MED REC IN PROCESS.  ROBAXIN BOTTLE IS EMPTY.  PER PT LAST DOSE 1/9/19 @ 1000.  
RX FILLED 12/08/18, QTY 90.  ERP WILL BE NOTIFIED.

## 2019-01-09 NOTE — NUR
CARDIAC & BP MONOTOR APPLIED.  DR RIVERA BS FOR EXAM.  PT REPORTS NEW PAIN IN 
NECK, BACK, CHEST, LT EAR.  PT'S SPOUSE CONTRIBUTING TO MEDICAL INFORMATION.  
PT SITTING UP ON SIDE OF BED, ABLE TO MOVE ALL EXTREMETIES. PT STATES SHE TOOK 
NAPROXEN TODAY: TOTAL OF 6 IN SETS OF 3 - LAST DOSE UNKOWN.  HAS BEEN TAKING 
NORCO; STATES "I'M DOWN TO TWO A DAY" (IN THE AFTERNOON) OF NORCO.  HAS BEEN 
TAKING NORCO "2 EVERY 4 HRS FOR A COUPLE OF WEEKS", THEN 1 Q 4HRS "FOR A COUPLE 
OF WEEKS". 

LOWER BACK SURGICAL SCAR: WELL HEALED.  OLD SURGICAL SCAR TO POSTERIOR NECK.

## 2019-01-09 NOTE — NUR
PT. IS NOT WANTING TO GO HOME.  DISCUSSED WITH DR. RIVERA.  MD STATES PT. DOES 
NOT HAVE AN ADMITTABLE CAUSE.  PT. WAS GIVEN DISCHARGE INSTRUCTIONS WITH 
UNDERSTANDING VERBALIZED ALONG WITH WILLINGNESS TO COMPLY.  PT.'S IV WAS 
DCD',CATH TIP INTACT.  PRESSURE HELD WITH HEMOSTASIS ACHIEVED.  PT. ASSISTED TO 
THE DISCHARGE DESK BY WHEELCHAIR.

## 2019-01-23 ENCOUNTER — HOSPITAL ENCOUNTER (OUTPATIENT)
Dept: HOSPITAL 8 - CFH | Age: 59
Discharge: HOME | End: 2019-01-23
Attending: FAMILY MEDICINE
Payer: MEDICARE

## 2019-01-23 DIAGNOSIS — Z12.31: Primary | ICD-10-CM

## 2019-01-23 DIAGNOSIS — N95.9: ICD-10-CM

## 2019-01-23 DIAGNOSIS — M85.88: ICD-10-CM

## 2019-01-23 PROCEDURE — 77080 DXA BONE DENSITY AXIAL: CPT

## 2019-02-06 ENCOUNTER — HOSPITAL ENCOUNTER (OUTPATIENT)
Dept: HOSPITAL 8 - CFH | Age: 59
Discharge: HOME | End: 2019-02-06
Attending: FAMILY MEDICINE
Payer: MEDICARE

## 2019-02-06 DIAGNOSIS — K42.9: Primary | ICD-10-CM

## 2019-02-06 PROCEDURE — 76705 ECHO EXAM OF ABDOMEN: CPT

## 2019-02-27 ENCOUNTER — HOSPITAL ENCOUNTER (OUTPATIENT)
Dept: HOSPITAL 8 - STAR | Age: 59
Discharge: HOME | End: 2019-02-27
Attending: SURGERY
Payer: MEDICARE

## 2019-02-27 DIAGNOSIS — Z02.9: Primary | ICD-10-CM

## 2019-03-11 ENCOUNTER — HOSPITAL ENCOUNTER (OUTPATIENT)
Dept: HOSPITAL 8 - OUT | Age: 59
Discharge: HOME | End: 2019-03-11
Attending: SURGERY
Payer: MEDICARE

## 2019-03-11 VITALS — BODY MASS INDEX: 21.81 KG/M2 | WEIGHT: 152.34 LBS | HEIGHT: 70 IN

## 2019-03-11 VITALS — DIASTOLIC BLOOD PRESSURE: 58 MMHG | SYSTOLIC BLOOD PRESSURE: 95 MMHG

## 2019-03-11 DIAGNOSIS — I10: ICD-10-CM

## 2019-03-11 DIAGNOSIS — E03.9: ICD-10-CM

## 2019-03-11 DIAGNOSIS — K43.0: Primary | ICD-10-CM

## 2019-03-11 DIAGNOSIS — J44.9: ICD-10-CM

## 2019-03-11 DIAGNOSIS — Z91.040: ICD-10-CM

## 2019-03-11 PROCEDURE — 49568: CPT

## 2019-03-11 PROCEDURE — C1781 MESH (IMPLANTABLE): HCPCS

## 2019-03-11 PROCEDURE — 49561: CPT

## 2019-03-11 RX ADMIN — FENTANYL CITRATE PRN MCG: 50 INJECTION INTRAMUSCULAR; INTRAVENOUS at 11:15

## 2019-03-11 RX ADMIN — FENTANYL CITRATE PRN MCG: 50 INJECTION INTRAMUSCULAR; INTRAVENOUS at 10:50

## 2021-01-24 ENCOUNTER — HOSPITAL ENCOUNTER (EMERGENCY)
Dept: HOSPITAL 8 - ED | Age: 61
Discharge: HOME | End: 2021-01-24
Payer: MEDICARE

## 2021-01-24 VITALS — SYSTOLIC BLOOD PRESSURE: 146 MMHG | DIASTOLIC BLOOD PRESSURE: 90 MMHG

## 2021-01-24 VITALS — WEIGHT: 154.32 LBS | BODY MASS INDEX: 22.09 KG/M2 | HEIGHT: 70 IN

## 2021-01-24 DIAGNOSIS — G89.29: ICD-10-CM

## 2021-01-24 DIAGNOSIS — I10: ICD-10-CM

## 2021-01-24 DIAGNOSIS — H65.02: ICD-10-CM

## 2021-01-24 DIAGNOSIS — R51.9: ICD-10-CM

## 2021-01-24 DIAGNOSIS — Z90.49: ICD-10-CM

## 2021-01-24 DIAGNOSIS — J30.2: ICD-10-CM

## 2021-01-24 DIAGNOSIS — F17.200: ICD-10-CM

## 2021-01-24 DIAGNOSIS — U07.1: Primary | ICD-10-CM

## 2021-01-24 DIAGNOSIS — J02.9: ICD-10-CM

## 2021-01-24 DIAGNOSIS — Z86.718: ICD-10-CM

## 2021-01-24 PROCEDURE — 87635 SARS-COV-2 COVID-19 AMP PRB: CPT

## 2021-01-24 PROCEDURE — 99283 EMERGENCY DEPT VISIT LOW MDM: CPT

## 2021-01-24 NOTE — NUR
pt ambulates from triage to room with a slow and shuffling gait. awaiting erp 
at this time. pt changed into gown and is resting in gurney with call light 
within reach.

## 2021-01-24 NOTE — NUR
PT D/C WITH D/C SUMMARY AND SCRIPTS. ALL QUESTIONS ANSWERED. PT AMBULATES TO 
LOBBY WITH STEADY GAIT FOR D/C HOME AND DENIES ANY OTHER NEEDS PERTAINING TO 
THIS VISIT. PER PT, PT STEP SON TO PICK PT UP FOR SAFE D/C HOME. PT VSS UPON 
D/C. PT DENIES ANY OTHER NEEDS PERTAINING TO THIS VISIT.